# Patient Record
Sex: FEMALE | Race: AMERICAN INDIAN OR ALASKA NATIVE
[De-identification: names, ages, dates, MRNs, and addresses within clinical notes are randomized per-mention and may not be internally consistent; named-entity substitution may affect disease eponyms.]

---

## 2018-10-12 ENCOUNTER — HOSPITAL ENCOUNTER (OUTPATIENT)
Dept: HOSPITAL 5 - CT | Age: 33
Discharge: HOME | End: 2018-10-12
Payer: MEDICAID

## 2018-10-12 DIAGNOSIS — I25.10: ICD-10-CM

## 2018-10-12 DIAGNOSIS — I10: ICD-10-CM

## 2018-10-12 DIAGNOSIS — Z87.891: ICD-10-CM

## 2018-10-12 DIAGNOSIS — Z86.73: ICD-10-CM

## 2018-10-12 DIAGNOSIS — R76.11: Primary | ICD-10-CM

## 2018-10-12 PROCEDURE — 71046 X-RAY EXAM CHEST 2 VIEWS: CPT

## 2018-10-12 NOTE — XRAY REPORT
ROUTINE CHEST, TWO VIEWS:



HISTORY:  Non-specific reaction to PPD test.



The trachea, heart, mediastinal contour, lung fields and bony thorax 

are unremarkable.



IMPRESSION:

Unremarkable chest x-ray.